# Patient Record
Sex: FEMALE | Race: WHITE | URBAN - METROPOLITAN AREA
[De-identification: names, ages, dates, MRNs, and addresses within clinical notes are randomized per-mention and may not be internally consistent; named-entity substitution may affect disease eponyms.]

---

## 2019-03-19 ENCOUNTER — OFFICE VISIT (OUTPATIENT)
Dept: FAMILY MEDICINE CLINIC | Age: 49
End: 2019-03-19
Payer: COMMERCIAL

## 2019-03-19 VITALS
HEART RATE: 110 BPM | OXYGEN SATURATION: 98 % | SYSTOLIC BLOOD PRESSURE: 130 MMHG | RESPIRATION RATE: 18 BRPM | DIASTOLIC BLOOD PRESSURE: 80 MMHG | TEMPERATURE: 99.7 F

## 2019-03-19 DIAGNOSIS — J10.1 INFLUENZA A: Primary | ICD-10-CM

## 2019-03-19 DIAGNOSIS — R52 BODY ACHES: ICD-10-CM

## 2019-03-19 LAB
INFLUENZA A ANTIBODY: POSITIVE
INFLUENZA B ANTIBODY: ABNORMAL

## 2019-03-19 PROCEDURE — 87804 INFLUENZA ASSAY W/OPTIC: CPT | Performed by: NURSE PRACTITIONER

## 2019-03-19 PROCEDURE — 99202 OFFICE O/P NEW SF 15 MIN: CPT | Performed by: NURSE PRACTITIONER

## 2019-03-19 RX ORDER — ESTROGENS, CONJUGATED 0.45 MG/1
TABLET, FILM COATED ORAL
COMMUNITY
Start: 2018-12-25

## 2019-03-19 RX ORDER — OMEPRAZOLE 20 MG/1
CAPSULE, DELAYED RELEASE ORAL
COMMUNITY
Start: 2019-01-16

## 2019-03-19 RX ORDER — OSELTAMIVIR PHOSPHATE 75 MG/1
75 CAPSULE ORAL 2 TIMES DAILY
Qty: 10 CAPSULE | Refills: 0 | Status: SHIPPED | OUTPATIENT
Start: 2019-03-19 | End: 2019-03-24

## 2019-03-19 ASSESSMENT — ENCOUNTER SYMPTOMS
DIARRHEA: 0
WHEEZING: 0
ABDOMINAL PAIN: 0
SORE THROAT: 1
COUGH: 1
VOMITING: 0

## 2020-07-31 ENCOUNTER — OFFICE VISIT (OUTPATIENT)
Dept: FAMILY MEDICINE CLINIC | Age: 50
End: 2020-07-31
Payer: COMMERCIAL

## 2020-07-31 ENCOUNTER — HOSPITAL ENCOUNTER (OUTPATIENT)
Age: 50
Setting detail: SPECIMEN
Discharge: HOME | End: 2020-07-31
Payer: COMMERCIAL

## 2020-07-31 ENCOUNTER — TELEPHONE (OUTPATIENT)
Dept: FAMILY MEDICINE CLINIC | Age: 50
End: 2020-07-31

## 2020-07-31 VITALS
HEART RATE: 110 BPM | WEIGHT: 274 LBS | DIASTOLIC BLOOD PRESSURE: 87 MMHG | TEMPERATURE: 99.7 F | SYSTOLIC BLOOD PRESSURE: 153 MMHG | HEIGHT: 66 IN | OXYGEN SATURATION: 98 % | BODY MASS INDEX: 44.03 KG/M2

## 2020-07-31 PROCEDURE — 99202 OFFICE O/P NEW SF 15 MIN: CPT | Performed by: NURSE PRACTITIONER

## 2020-07-31 RX ORDER — CEPHALEXIN 500 MG/1
500 CAPSULE ORAL 4 TIMES DAILY
Qty: 28 CAPSULE | Refills: 0 | Status: SHIPPED | OUTPATIENT
Start: 2020-07-31 | End: 2020-08-07

## 2020-07-31 ASSESSMENT — PATIENT HEALTH QUESTIONNAIRE - PHQ9
SUM OF ALL RESPONSES TO PHQ QUESTIONS 1-9: 0
1. LITTLE INTEREST OR PLEASURE IN DOING THINGS: 0
SUM OF ALL RESPONSES TO PHQ QUESTIONS 1-9: 0
2. FEELING DOWN, DEPRESSED OR HOPELESS: 0
SUM OF ALL RESPONSES TO PHQ9 QUESTIONS 1 & 2: 0

## 2020-07-31 NOTE — TELEPHONE ENCOUNTER
Patient calling, she thought you were going to send over 3 rx's the pharmacy only has 2 she was just making sure that was right.  Please advise

## 2020-07-31 NOTE — PATIENT INSTRUCTIONS
Patient Education        Stomatitis: Care Instructions  Your Care Instructions  Stomatitis is swelling and redness of the lining of your mouth. It can cause painful sores that can make it hard for you to eat, drink, or swallow. Stomatitis may be caused by a viral or bacterial infection, a disease, or not taking care of your teeth and gums properly. Other causes include reactions to smoking, burns from hot food or drinks, or an allergic reaction. Allergy causes may be a result of flavorings such as spearmint or cinnamon that are used in chewing gum, toothpaste, soft drinks, candies, and other products. Your doctor may prescribe pain medicines or special mouth rinses. He or she may tell you to quit using some products that may be causing the sores. It can take up to 2 weeks for the sores to heal.  Some people with stomatitis also get a yeast infection of the mouth, called thrush. Medicines can treat this problem. Follow-up care is a key part of your treatment and safety. Be sure to make and go to all appointments, and call your doctor if you are having problems. It's also a good idea to know your test results and keep a list of the medicines you take. How can you care for yourself at home? · Be safe with medicines. Read and follow all instructions on the label. ? If the doctor gave you a prescription medicine for pain, take it as prescribed. ? If you are not taking a prescription pain medicine, ask your doctor if you can take an over-the-counter medicine. · If your doctor prescribed antibiotics, take them as directed. Do not stop taking them just because you feel better. You need to take the full course of antibiotics. · Use prescription mouth rinses as prescribed. Ask your doctor if you can freeze the mouth rinse in an ice cube tray. Sucking on a frozen cube of the mouth rinse can help ease the pain. · Make a rinse to keep your mouth from getting dry.  Add 1 teaspoon baking soda and ½ teaspoon salt to a quart of water. Use it to rinse your mouth 4 to 6 times each day. Spit out the rinse. Do not swallow it. · Do not use a mouthwash or other over-the-counter rinse with alcohol. These can dry out your mouth or cause more pain. · If your doctor gave you mouthwash or lozenges to treat your yeast infection, use them as directed. · Eat soft foods such as mashed potatoes and other cooked vegetables, noodles, applesauce, clear broth soups, yogurt, and cottage cheese. You can get extra protein by adding protein powder to milk shakes or breakfast drinks. Avoid eating spicy or crunchy foods. · Try eating cold foods such as ice cream or yogurt. · Avoid drinking high-acid juices such as orange, grapefruit, and cranberry juices. · Drink plenty of fluids to prevent dehydration. Drinking through a straw may help with pain. · Practice good oral hygiene. Use a very soft toothbrush to brush your teeth at least two times a day. Or use your finger to brush your teeth if your mouth is sore. When should you call for help? Call your doctor now or seek immediate medical care if:  · You have new or worse symptoms of infection, such as:  ? Increased pain, swelling, warmth, or redness. ? Red streaks leading from the area. ? Pus draining from the area. ? A fever. Watch closely for changes in your health, and be sure to contact your doctor if:  · You do not get better as expected. Where can you learn more? Go to https://[email or web address] Rumpus.[email or web address]. org and sign in to your [employer] account. Enter C242 in the [facility] box to learn more about \"Stomatitis: Care Instructions. \"     If you do not have an account, please click on the \"Sign Up Now\" link. Current as of: March 25, 2020               Content Version: 12.5  © 4505-8446 [employer]. Care instructions adapted under license by [facility] ([facility]).  If you have questions about a medical condition or this instruction, always ask your healthcare professional.

## 2020-08-01 ENCOUNTER — TELEPHONE (OUTPATIENT)
Dept: FAMILY MEDICINE CLINIC | Age: 50
End: 2020-08-01

## 2020-08-01 RX ORDER — FLUCONAZOLE 150 MG/1
150 TABLET ORAL
Qty: 2 TABLET | Refills: 0 | Status: SHIPPED | OUTPATIENT
Start: 2020-08-01 | End: 2020-08-07

## 2020-08-01 ASSESSMENT — ENCOUNTER SYMPTOMS
SORE THROAT: 0
ABDOMINAL PAIN: 0
COUGH: 0
SHORTNESS OF BREATH: 0
SINUS PAIN: 0
DIARRHEA: 0
VOMITING: 0
NAUSEA: 0

## 2020-08-01 NOTE — PROGRESS NOTES
[facility]  [address] [facility] [address] [address]  Dept: [phone]  Dept Fax: [phone]    Storm Vidales is a 45 y.o. female who presents to the urgent care today for her medicalconditions/complaints as noted below. Storm Vidales is c/o of Oral Swelling (white spots, swelling and pain - started on Wednesday - feels like dry mouth)      HPI:     58-year-old female patient presents with complaint of mouth problem. Reports that to her oral mucosa of the lower lip she has white drainage, mild lip swelling and pain. Reports this is been ongoing over the past 2 to 3 days. Denies any difficulty swallowing or breathing. There is no significant tongue swelling or drainage. Denies fevers or chills. Denies nausea or vomiting. No past medical history on file. Current Outpatient Medications   Medication Sig Dispense Refill    Multiple Vitamins-Minerals (MULTIVITAMIN ADULT PO) Multi-Vitamin Oral Tablet TAKE 1 TABLET DAILY. Refills: 0 Active      cephALEXin (KEFLEX) 500 MG capsule Take 1 capsule by mouth 4 times daily for 7 days 28 capsule 0    nystatin (MYCOSTATIN) 199597 UNIT/ML suspension Take 5 mLs by mouth 4 times daily for 10 days Retain in mouth as long as possible 200 mL 0    omeprazole (PRILOSEC) 20 MG delayed release capsule       PREMARIN 0.45 MG tablet        No current facility-administered medications for this visit. Allergies   Allergen Reactions    Erythromycin        Subjective:      Review of Systems   Constitutional: Negative for chills and fever. HENT: Positive for mouth sores. Negative for ear pain, sinus pain and sore throat. Respiratory: Negative for cough and shortness of breath. Cardiovascular: Negative for chest pain and palpitations. Gastrointestinal: Negative for abdominal pain, diarrhea, nausea and vomiting. Neurological: Negative for dizziness and headaches.    All other systems reviewed and are negative. Objective:     Physical Exam  Vitals signs and nursing note reviewed. Constitutional:       Appearance: Normal appearance. HENT:      Nose: Nose normal.      Mouth/Throat:      Mouth: Mucous membranes are moist.      Comments: Oral mucosal lower lip stomatitis white discharge  Cardiovascular:      Rate and Rhythm: Normal rate. Pulmonary:      Effort: Pulmonary effort is normal.      Breath sounds: Normal breath sounds. Skin:     General: Skin is warm and dry. Neurological:      General: No focal deficit present. Mental Status: She is alert and oriented to person, place, and time. BP (!) 153/87 (Site: Left Upper Arm, Position: Sitting, Cuff Size: Large Adult)   Pulse 110   Temp 99.7 °F (37.6 °C) (Tympanic)   Ht 5' 5.75\" (1.67 m)   Wt 274 lb (124.3 kg)   SpO2 98%   BMI 44.56 kg/m²   Lab Review   No visits with results within 2 Month(s) from this visit. Latest known visit with results is:   Office Visit on 03/19/2019   Component Date Value    Influenza A Ab 03/19/2019 positive*    Influenza B Ab 03/19/2019 neg        Assessment:       Diagnosis Orders   1. Candida infection of mouth  nystatin (MYCOSTATIN) 063558 UNIT/ML suspension    Culture, Virus, Non Respiratory   2. Cellulitis of skin of lip  cephALEXin (KEFLEX) 500 MG capsule    Culture, Virus, Non Respiratory   3. Stomatitis and mucositis  Culture, Virus, Non Respiratory       Plan:      Return if symptoms worsen or fail to improve. Orders Placed This Encounter   Medications    cephALEXin (KEFLEX) 500 MG capsule     Sig: Take 1 capsule by mouth 4 times daily for 7 days     Dispense:  28 capsule     Refill:  0    nystatin (MYCOSTATIN) 695318 UNIT/ML suspension     Sig: Take 5 mLs by mouth 4 times daily for 10 days Retain in mouth as long as possible     Dispense:  200 mL     Refill:  0       Will treat as dermatitis with nystatin mouth rinse and oral Keflex.   Obtain swab to rule out viral cause  Return with any worsening, follow-up with PCP       Patient given educational materials - see patient instructions. Discussed use, benefit, and side effects of prescribed medications. All patientquestions answered. Pt voiced understanding. This note was transcribed using dictation with Dragon services. Efforts were made to correct any errors but some words may be misinterpreted.      Electronically signed by MERLIN Doe CNP on 8/1/2020at 8:09 AM

## 2020-08-09 LAB
CULTURE: NORMAL
Lab: NORMAL
SPECIMEN DESCRIPTION: NORMAL